# Patient Record
Sex: FEMALE | Race: BLACK OR AFRICAN AMERICAN | NOT HISPANIC OR LATINO | URBAN - METROPOLITAN AREA
[De-identification: names, ages, dates, MRNs, and addresses within clinical notes are randomized per-mention and may not be internally consistent; named-entity substitution may affect disease eponyms.]

---

## 2023-08-26 ENCOUNTER — HOSPITAL ENCOUNTER (EMERGENCY)
Facility: HOSPITAL | Age: 33
Discharge: HOME | End: 2023-08-26
Attending: STUDENT IN AN ORGANIZED HEALTH CARE EDUCATION/TRAINING PROGRAM

## 2023-08-26 VITALS
TEMPERATURE: 99 F | HEART RATE: 80 BPM | OXYGEN SATURATION: 100 % | RESPIRATION RATE: 18 BRPM | HEIGHT: 63 IN | DIASTOLIC BLOOD PRESSURE: 89 MMHG | SYSTOLIC BLOOD PRESSURE: 134 MMHG | BODY MASS INDEX: 51.91 KG/M2 | WEIGHT: 293 LBS
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
## 2023-08-26 DIAGNOSIS — S20.211A CONTUSION OF RIBS, RIGHT, INITIAL ENCOUNTER: Primary | ICD-10-CM

## 2023-08-26 LAB
B-HCG UR QL: NEGATIVE
CTP QC/QA: YES

## 2023-08-26 PROCEDURE — 25000003 PHARM REV CODE 250

## 2023-08-26 PROCEDURE — 81025 URINE PREGNANCY TEST: CPT

## 2023-08-26 PROCEDURE — 99284 EMERGENCY DEPT VISIT MOD MDM: CPT | Mod: 25

## 2023-08-26 RX ORDER — ACETAMINOPHEN 500 MG
500 TABLET ORAL EVERY 6 HOURS PRN
Qty: 28 TABLET | Refills: 0 | Status: SHIPPED | OUTPATIENT
Start: 2023-08-26 | End: 2023-09-02

## 2023-08-26 RX ORDER — TRAMADOL HYDROCHLORIDE 50 MG/1
50 TABLET ORAL EVERY 6 HOURS PRN
Qty: 12 TABLET | Refills: 0 | Status: SHIPPED | OUTPATIENT
Start: 2023-08-26 | End: 2023-08-29

## 2023-08-26 RX ORDER — ACETAMINOPHEN 500 MG
1000 TABLET ORAL
Status: COMPLETED | OUTPATIENT
Start: 2023-08-26 | End: 2023-08-26

## 2023-08-26 RX ADMIN — ACETAMINOPHEN 1000 MG: 500 TABLET ORAL at 09:08

## 2023-08-27 NOTE — ED PROVIDER NOTES
Encounter Date: 8/26/2023    SCRIBE #1 NOTE: I, Krunal Bravo, margy scribing for, and in the presence of,  Venkatesh Robles PA-C.       History     Chief Complaint   Patient presents with    Fall     Patient arrived to the ER via POV w CC: fall approx 5 feet off a ladder landing on her RT chest onto concrete. Took 1500 mg Tylenol at 3:30pm, fall happened approx 1:30pm. Rates pain 7/10.Patient w PMH of HNT.      Starr Capellan is a 32 y.o. female who presents to the ED due to rib pain.   Patient reports falling from 5.5 feet on a ladder down onto concrete, landing on her right sided chest just prior to arrival. She reports right rib pain that is worse with deep breaths and shoulder ROM. Pain is described as deep and sharp. Denies head trauma, syncope, seizures, vomiting, and shoulder pain.  No attempt treatments prior to arrival to attempt to alleviate symptoms.  The pain is 10/10 in severity.    The history is provided by the patient. No  was used.     Review of patient's allergies indicates:   Allergen Reactions    Toradol [ketorolac] Swelling    Pcn [penicillins] Hives     Past Medical History:   Diagnosis Date    Hypertension      Past Surgical History:   Procedure Laterality Date    CHOLECYSTECTOMY       No family history on file.     Review of Systems   Constitutional:  Negative for diaphoresis, fatigue and unexpected weight change.   HENT:  Negative for sinus pain and sore throat.    Eyes:  Negative for pain, redness and visual disturbance.   Respiratory:  Negative for cough, chest tightness, shortness of breath and wheezing.    Cardiovascular:  Negative for chest pain and palpitations.   Gastrointestinal:  Negative for abdominal pain, blood in stool, diarrhea, nausea and vomiting.   Endocrine: Negative for polydipsia, polyphagia and polyuria.   Genitourinary:  Negative for dysuria, frequency and urgency.   Musculoskeletal:  Negative for back pain.        Positive for rib pain.   Skin:  Negative  for rash.   Allergic/Immunologic: Negative for environmental allergies.   Neurological:  Negative for dizziness, seizures, syncope and headaches.   Psychiatric/Behavioral:  Negative for suicidal ideas.        Physical Exam     Initial Vitals [08/26/23 1829]   BP Pulse Resp Temp SpO2   (!) 133/95 94 20 99.3 °F (37.4 °C) 99 %      MAP       --         Physical Exam    Nursing note and vitals reviewed.  Constitutional: Vital signs are normal. She appears well-developed and well-nourished. She is Obese . She is active and cooperative. She does not appear ill. No distress.   HENT:   Head: Normocephalic and atraumatic.   Right Ear: Hearing and external ear normal. No hemotympanum.   Left Ear: Hearing and external ear normal. No hemotympanum.   Nose: Nose normal. No nasal deformity. No epistaxis.   Eyes: Conjunctivae and EOM are normal. Pupils are equal, round, and reactive to light. Right conjunctiva has no hemorrhage. Left conjunctiva has no hemorrhage.   Neck: Phonation normal.   Normal range of motion.  Cardiovascular:  Normal rate and regular rhythm.           No murmur heard.  Pulmonary/Chest: Effort normal and breath sounds normal. No tachypnea. No respiratory distress. She has no decreased breath sounds.     No diminished breath sounds throughout anterior or posterior lung fields.  Respirations are even and unlabored.  Significant tenderness to palpation in the upper right-sided chest wall just anterior to the mid axillary line.   Abdominal: Abdomen is soft. She exhibits no distension. There is no abdominal tenderness.   Musculoskeletal:      Cervical back: Normal range of motion.     Neurological: She is alert and oriented to person, place, and time. She has normal strength. No sensory deficit. GCS eye subscore is 4. GCS verbal subscore is 5. GCS motor subscore is 6.   Skin: Skin is warm. Capillary refill takes less than 2 seconds.         ED Course   Procedures  Labs Reviewed   POCT URINE PREGNANCY           Imaging Results              X-Ray Chest PA And Lateral (Final result)  Result time 08/26/23 21:48:17      Final result by Deena Jimenez MD (08/26/23 21:48:17)                   Impression:      No acute cardiopulmonary process identified.    No acute right-sided rib fracture seen.      Electronically signed by: Deena Jimenez MD  Date:    08/26/2023  Time:    21:48               Narrative:    EXAMINATION:  XR CHEST PA AND LATERAL; XR RIBS 2 VIEW RIGHT    CLINICAL HISTORY:  Fall on and from ladder, initial encounter    TECHNIQUE:  PA and lateral views of the chest were performed.  Right ribs 4 total views.    COMPARISON:  None    FINDINGS:  Cardiac silhouette is normal in size.  Lungs are symmetrically expanded.  No evidence of focal consolidative process, pneumothorax, or significant pleural effusion.  No acute osseous abnormality identified.  No acute displaced right-sided rib fractures identified.                                       X-Ray Ribs 2 View Right (Final result)  Result time 08/26/23 21:48:17      Final result by Deena Jimenez MD (08/26/23 21:48:17)                   Impression:      No acute cardiopulmonary process identified.    No acute right-sided rib fracture seen.      Electronically signed by: Deena Jimenez MD  Date:    08/26/2023  Time:    21:48               Narrative:    EXAMINATION:  XR CHEST PA AND LATERAL; XR RIBS 2 VIEW RIGHT    CLINICAL HISTORY:  Fall on and from ladder, initial encounter    TECHNIQUE:  PA and lateral views of the chest were performed.  Right ribs 4 total views.    COMPARISON:  None    FINDINGS:  Cardiac silhouette is normal in size.  Lungs are symmetrically expanded.  No evidence of focal consolidative process, pneumothorax, or significant pleural effusion.  No acute osseous abnormality identified.  No acute displaced right-sided rib fractures identified.                                    X-Rays:   Independently Interpreted Readings:   Chest X-Ray:  Trachea is midline.  Lungs symmetrically expanded.  No pneumothorax.  No focal consolidative process.  Bilateral costophrenic angles are clear.  Cardiac silhouette is within normal limits.  Bones and soft tissues are unremarkable.  No acute fractures noted.   Other Readings:  X-ray ribs two-view right:  Bones are well mineralized.  No acute fractures or dislocations are noted.  Visualized lung fields are clear with no signs of consolidation or pneumothorax.    Medications   acetaminophen tablet 1,000 mg (1,000 mg Oral Given 8/26/23 2158)     Medical Decision Making  32-year-old female presenting to the emergency department with a chief complaint of fall.  Patient fell 5 feet off of a ladder and landed on her right-sided chest wall.  Reports pain that is worsened with breathing and moving.  On physical exam, patient is clinically well-appearing and in no acute distress.  Tenderness to palpation in the right-sided chest wall underneath the axilla.  Could reproduce this pain with range of motion of the shoulder.  Lungs clear to auscultation, no diminished sounds.    Differential diagnosis includes but is not limited to contusion, fracture, or dislocation of the right-sided ribs, pneumothorax, hemothorax.    X-rays negative for any acute fractures, dislocations, or pneumothorax.  Presentation is consistent with a traumatic contusion of the right chest wall.  Acute management in the emergency department with 1 dose of Tylenol.  Patient has a history of gastric ulcer so can not take NSAIDs.  Allergic to Toradol.  Will prescribe a short course of tramadol for analgesia at home if Tylenol is insufficient.    Return precautions were discussed, all patient questions were answered, and the patient was agreeable to the plan of care.  She was discharged home in stable condition and will follow up with her primary care provider or return to the emergency department if her symptoms worsen or do not improve.     Amount and/or  Complexity of Data Reviewed  Labs: ordered.  Radiology: ordered.    Risk  OTC drugs.  Prescription drug management.                               I, Venkatesh Robles PA-C, personally performed the services described in this documentation. All medical record entries made by the scribe were at my direction and in my presence. I have reviewed the chart and agree that the record reflects my personal performance and is accurate and complete.    Clinical Impression:   Final diagnoses:  [W11.XXXA] Fall from ladder  [S20.211A] Contusion of ribs, right, initial encounter (Primary)        ED Disposition Condition    Discharge Stable          ED Prescriptions       Medication Sig Dispense Start Date End Date Auth. Provider    acetaminophen (TYLENOL) 500 MG tablet Take 1 tablet (500 mg total) by mouth every 6 (six) hours as needed. 28 tablet 8/26/2023 9/2/2023 Venkatesh Robles PA-C    traMADoL (ULTRAM) 50 mg tablet Take 1 tablet (50 mg total) by mouth every 6 (six) hours as needed for Pain. 12 tablet 8/26/2023 8/29/2023 Venkatesh Robles PA-C          Follow-up Information       Follow up With Specialties Details Why Contact Info    [facility] - Emergency Dept Emergency Medicine Go to  If symptoms worsen [address] [facility] [id number] [phone]    [facility] -  Schedule an appointment as soon as possible for a visit  As needed, If symptoms worsen [address] [place] [address]  [phone]               Venkatesh Robles PA-C  08/26/23 8177

## 2023-08-27 NOTE — ED NOTES
Pt fell off of a ladder at about 1 pm today, no head trauma or LOC, Pain to left ribs with movemonet and deep breathing    LOC: Pt is awake alert and aware of environment, oriented X3 and speaking appropriately  Appearance: Pt is in no acute distress, Pt is well groomed and clean  Skin: skin is warm and dry with normal turgor, mucus membranes are moist and pink, skin is intact with no bruising or breakdown  Muskuloskeletal: Pt moves all extremities well, there is no obvious swelling or deformities noted, pulses are intact.  Respiratory: Airway is open and patent, respirations are spontaneous and even.  Cardiac:  no edema and cap refill is <3sec  Abdomen: soft, non-tender and non-distended  Neuro: Pt follows commands easily and has no obvious deficits

## 2023-08-27 NOTE — DISCHARGE INSTRUCTIONS
